# Patient Record
Sex: FEMALE | Race: WHITE | NOT HISPANIC OR LATINO | Employment: FULL TIME | URBAN - METROPOLITAN AREA
[De-identification: names, ages, dates, MRNs, and addresses within clinical notes are randomized per-mention and may not be internally consistent; named-entity substitution may affect disease eponyms.]

---

## 2017-01-03 ENCOUNTER — HOSPITAL ENCOUNTER (EMERGENCY)
Facility: HOSPITAL | Age: 32
Discharge: HOME | End: 2017-01-03
Attending: EMERGENCY MEDICINE | Admitting: EMERGENCY MEDICINE

## 2017-01-03 ENCOUNTER — APPOINTMENT (OUTPATIENT)
Dept: GENERAL RADIOLOGY | Facility: HOSPITAL | Age: 32
End: 2017-01-03

## 2017-01-03 VITALS
BODY MASS INDEX: 22.2 KG/M2 | HEART RATE: 77 BPM | TEMPERATURE: 97.7 F | SYSTOLIC BLOOD PRESSURE: 122 MMHG | HEIGHT: 64 IN | DIASTOLIC BLOOD PRESSURE: 75 MMHG | WEIGHT: 130 LBS | RESPIRATION RATE: 16 BRPM | OXYGEN SATURATION: 99 %

## 2017-01-03 DIAGNOSIS — S70.211A: ICD-10-CM
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
## 2017-01-03 DIAGNOSIS — M79.642 LEFT HAND PAIN: ICD-10-CM

## 2017-01-03 LAB
B-HCG UR QL: NEGATIVE
INTERNAL NEGATIVE CONTROL: NEGATIVE
INTERNAL POSITIVE CONTROL: POSITIVE
Lab: NORMAL

## 2017-01-03 PROCEDURE — 99284 EMERGENCY DEPT VISIT MOD MDM: CPT

## 2017-01-03 PROCEDURE — 73130 X-RAY EXAM OF HAND: CPT

## 2017-01-03 RX ORDER — IBUPROFEN 600 MG/1
600 TABLET ORAL ONCE
Status: COMPLETED | OUTPATIENT
Start: 2017-01-03 | End: 2017-01-03

## 2017-01-03 RX ORDER — CYCLOBENZAPRINE HCL 5 MG
5 TABLET ORAL 3 TIMES DAILY PRN
Qty: 15 TABLET | Refills: 0 | Status: SHIPPED | OUTPATIENT
Start: 2017-01-03

## 2017-01-03 RX ADMIN — IBUPROFEN 600 MG: 600 TABLET ORAL at 07:28

## 2017-01-03 NOTE — DISCHARGE INSTRUCTIONS
Take 3 over-the-counter Ibuprofen tablets 3 times a day as needed for pain. Try to take the medication with food. If you develop upper abdominal discomfort, discontinue use.  Stretch and rest.  Push fluids.  Off work today and tomorrow.  Return to the ED if you experience worsening symptoms, significant headache, or any abdominal pain.    Follow up with one of the [facility] physicians below as needed for persistent symptoms and to setup primary care.    (Dr. Calderon, Dr. Huston, Dr. Garcia, and Dr. Gloria.)  [facility], Primary Care, [phone], [address], [place] [address]    [facility], Primary Care, [phone], [address] [facility] [facility], [facility] [place], [address] [facility], Primary Care, [phone], [address], [address] [place], [address] [facility], Primary Care, [phone], [address], [address] [place], [address]     [place] [address] [facility], Primary Care, [phone], [address], [address] [place], [address]    [place] [address] [facility], Primary Care, [phone], [address]    [place], [address] [facility], Primary Care, [phone], [address] [facility] [facility], [address] [place], [address] [facility], Primary Care, [phone], [address], [address] [place], [address] [facility]) [facility], Primary Care, [phone], [address] [facility], [address] [place], [address] [facility], Primary Care, [phone], [address] [facility], [address] [place], [address] [facility], Primary Care, [phone], [address], [place], [address] [place] [address]  [facility], Primary Care, [phone], [address] [id number]
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
